# Patient Record
Sex: MALE | Race: OTHER | Employment: FULL TIME | URBAN - NONMETROPOLITAN AREA
[De-identification: names, ages, dates, MRNs, and addresses within clinical notes are randomized per-mention and may not be internally consistent; named-entity substitution may affect disease eponyms.]

---

## 2024-03-30 ENCOUNTER — HOSPITAL ENCOUNTER (EMERGENCY)
Age: 34
Discharge: HOME | End: 2024-03-30

## 2024-03-30 VITALS
TEMPERATURE: 99.4 F | DIASTOLIC BLOOD PRESSURE: 78 MMHG | RESPIRATION RATE: 20 BRPM | SYSTOLIC BLOOD PRESSURE: 118 MMHG | OXYGEN SATURATION: 97 % | WEIGHT: 220.8 LBS | HEART RATE: 122 BPM

## 2024-03-30 DIAGNOSIS — J10.1 INFLUENZA A: Primary | ICD-10-CM

## 2024-03-30 LAB
FLUAV AG SPEC QL: POSITIVE
FLUBV AG SPEC QL: NEGATIVE
S PYO AG THROAT QL: NEGATIVE
SARS-COV-2 RDRP RESP QL NAA+PROBE: NOT  DETECTED

## 2024-03-30 PROCEDURE — 87635 SARS-COV-2 COVID-19 AMP PRB: CPT

## 2024-03-30 PROCEDURE — 99203 OFFICE O/P NEW LOW 30 MIN: CPT

## 2024-03-30 PROCEDURE — 99204 OFFICE O/P NEW MOD 45 MIN: CPT | Performed by: NURSE PRACTITIONER

## 2024-03-30 PROCEDURE — 87651 STREP A DNA AMP PROBE: CPT

## 2024-03-30 PROCEDURE — 87804 INFLUENZA ASSAY W/OPTIC: CPT

## 2024-03-30 RX ORDER — ACETAMINOPHEN 500 MG
1000 TABLET ORAL EVERY 6 HOURS PRN
COMMUNITY

## 2024-03-30 RX ORDER — CETIRIZINE HYDROCHLORIDE 10 MG/1
10 TABLET ORAL DAILY
Qty: 30 TABLET | Refills: 2 | Status: SHIPPED | OUTPATIENT
Start: 2024-03-30

## 2024-03-30 RX ORDER — IBUPROFEN 800 MG/1
800 TABLET ORAL
Qty: 90 TABLET | Refills: 0 | Status: SHIPPED | OUTPATIENT
Start: 2024-03-30

## 2024-03-30 RX ORDER — ACETAMINOPHEN 500 MG
1000 TABLET ORAL EVERY 6 HOURS PRN
Qty: 180 TABLET | Refills: 1 | Status: SHIPPED | OUTPATIENT
Start: 2024-03-30

## 2024-03-30 RX ORDER — OSELTAMIVIR PHOSPHATE 75 MG/1
75 CAPSULE ORAL 2 TIMES DAILY
Qty: 10 CAPSULE | Refills: 0 | Status: SHIPPED | OUTPATIENT
Start: 2024-03-30 | End: 2024-04-04

## 2024-03-30 ASSESSMENT — ENCOUNTER SYMPTOMS
CHEST TIGHTNESS: 0
SHORTNESS OF BREATH: 0
COUGH: 1
VOMITING: 0
RHINORRHEA: 1
DIARRHEA: 0
NAUSEA: 0
SORE THROAT: 0

## 2024-03-30 ASSESSMENT — PAIN - FUNCTIONAL ASSESSMENT: PAIN_FUNCTIONAL_ASSESSMENT: 0-10

## 2024-03-30 ASSESSMENT — PAIN DESCRIPTION - FREQUENCY: FREQUENCY: CONTINUOUS

## 2024-03-30 ASSESSMENT — PAIN DESCRIPTION - PAIN TYPE: TYPE: ACUTE PAIN

## 2024-03-30 ASSESSMENT — PAIN SCALES - GENERAL: PAINLEVEL_OUTOF10: 8

## 2024-03-30 ASSESSMENT — PAIN DESCRIPTION - DESCRIPTORS: DESCRIPTORS: ACHING

## 2024-03-30 ASSESSMENT — PAIN DESCRIPTION - ONSET: ONSET: GRADUAL

## 2024-03-30 ASSESSMENT — PAIN DESCRIPTION - LOCATION: LOCATION: ABDOMEN;HEAD;GENERALIZED

## 2024-03-30 NOTE — ED PROVIDER NOTES
[facility] URGENT CARE  Urgent Care Encounter       CHIEF COMPLAINT       Chief Complaint   Patient presents with    Fever    Abdominal Pain    Generalized Body Aches    Headache    Nasal Congestion     \"Nose bleeding\"         Nurses Notes reviewed and I agree except as noted in the HPI.  HISTORY OF PRESENT ILLNESS   Jessa Abbasi is a 33 y.o. male who presents to the [place] urgent care for evaluation of fever, myalgia, and headache.  Patient is a Stateless speaking individual we are using the  line.  He denies known exposures to someone ill.  Does report congestion and rhinorrhea.  Patient reports that he does not have a thermometer but that he has felt warm.  Reports that the majority of the symptoms started roughly 2 days ago, but that he has been coughing for roughly 2 months.    The history is provided by the patient. No  was used.       REVIEW OF SYSTEMS     Review of Systems   Constitutional:  Positive for chills, fatigue and fever. Negative for activity change and appetite change.   HENT:  Positive for congestion and rhinorrhea. Negative for ear discharge, ear pain and sore throat.    Respiratory:  Positive for cough. Negative for chest tightness and shortness of breath.    Cardiovascular:  Negative for chest pain.   Gastrointestinal:  Negative for diarrhea, nausea and vomiting.   Genitourinary:  Negative for dysuria.   Musculoskeletal:  Positive for myalgias.   Skin:  Negative for rash.   Allergic/Immunologic: Negative for environmental allergies and food allergies.   Neurological:  Negative for dizziness and headaches.       PAST MEDICAL HISTORY   History reviewed. No pertinent past medical history.    SURGICALHISTORY     Patient  has no past surgical history on file.    CURRENT MEDICATIONS       Discharge Medication List as of 3/30/2024  6:06 PM        CONTINUE these medications which have NOT CHANGED    Details   !! acetaminophen (TYLENOL) 500 MG tablet Take 2          DIAGNOSTIC RESULTS     Labs:  Results for orders placed or performed during the hospital encounter of 03/30/24   Rapid influenza A/B antigens    Specimen: Nasopharyngeal   Result Value Ref Range    Flu A Antigen Positive (A) NEGATIVE    Flu B Antigen Negative NEGATIVE   COVID-19, Rapid    Specimen: Nasopharyngeal Swab   Result Value Ref Range    SARS-CoV-2, HARRIS NOT  DETECTED NOT DETECTED   Strep Screen Group A Throat   Result Value Ref Range    Rapid Strep A Screen NEGATIVE        IMAGING:    No orders to display         EKG: None      URGENT CARE COURSE:     Vitals:    03/30/24 1726   BP: 118/78   Pulse: (!) 122   Resp: 20   Temp: 99.4 °F (37.4 °C)   TempSrc: Temporal   SpO2: 97%   Weight: 100.2 kg (220 lb 12.8 oz)       Medications - No data to display         PROCEDURES:  None    FINAL IMPRESSION      1. Influenza A          DISPOSITION/ PLAN     Patient seen and evaluated for the above symptoms.  A rapid influenza was obtained and positive.  We did discuss that this is a viral infection and will resolve on its own.  We did discuss symptom management with Zyrtec, Flonase, and Mucinex D.  Instructed use over-the-counter Tylenol and Motrin for pain or fever.  Instructed to push oral fluids.  Instructed to isolate until symptoms improve along with resolution of fever for 24 hours without medications.  Instructed to complete good hand hygiene.  Instructed to follow-up with PCP in 3 to 5 days with new worsening symptoms.  Instructed to present to the emergent department for severe dyspnea, fever uncontrolled with acetaminophen, or other symptoms deemed emergent.  Patient is agreeable with the above plan and denies questions or concerns at this time.        PATIENT REFERRED TO:  No primary care provider on file.  No primary physician on file.      DISCHARGE MEDICATIONS:  Discharge Medication List as of 3/30/2024  6:06 PM        START taking these medications    Details   oseltamivir (TAMIFLU) 75 MG capsule Take

## 2024-03-30 NOTE — ED TRIAGE NOTES
Arrives to [facility] for the evaluation of ongoing runny nose, intermittent nose bleeding and body aches for \"months\".  As of yesterday developed fever and headache.  Denies vomiting, diarrhea, chills.  Unsure of the temp, felt warm but did not have a Thermometer.  Did take Tylenol last night.  Rates stomach ache, body aches and headache 8/10 in severity at this time.  Denies being in contact with anyone else who has been sick.  HIMANSHU Abbott in room to assess.  New orders pending.      Used  for communication via I pad    Name: Alexey